# Patient Record
Sex: MALE | Race: WHITE | NOT HISPANIC OR LATINO
[De-identification: names, ages, dates, MRNs, and addresses within clinical notes are randomized per-mention and may not be internally consistent; named-entity substitution may affect disease eponyms.]

---

## 2017-03-23 ENCOUNTER — APPOINTMENT (OUTPATIENT)
Dept: INTERNAL MEDICINE | Facility: CLINIC | Age: 52
End: 2017-03-23

## 2017-03-23 VITALS
SYSTOLIC BLOOD PRESSURE: 112 MMHG | HEART RATE: 74 BPM | BODY MASS INDEX: 30.26 KG/M2 | RESPIRATION RATE: 14 BRPM | DIASTOLIC BLOOD PRESSURE: 60 MMHG | WEIGHT: 199 LBS

## 2017-03-23 DIAGNOSIS — R04.2 HEMOPTYSIS: ICD-10-CM

## 2017-03-23 DIAGNOSIS — Z12.11 ENCOUNTER FOR SCREENING FOR MALIGNANT NEOPLASM OF COLON: ICD-10-CM

## 2017-03-23 DIAGNOSIS — R13.10 DYSPHAGIA, UNSPECIFIED: ICD-10-CM

## 2017-03-23 DIAGNOSIS — M25.559 PAIN IN UNSPECIFIED HIP: ICD-10-CM

## 2017-03-23 DIAGNOSIS — Z82.69 FAMILY HISTORY OF OTHER DISEASES OF THE MUSCULOSKELETAL SYSTEM AND CONNECTIVE TISSUE: ICD-10-CM

## 2017-03-23 DIAGNOSIS — F17.200 NICOTINE DEPENDENCE, UNSPECIFIED, UNCOMPLICATED: ICD-10-CM

## 2017-03-23 DIAGNOSIS — R05 COUGH: ICD-10-CM

## 2017-03-23 RX ORDER — OXYCODONE HYDROCHLORIDE 30 MG/1
30 TABLET ORAL
Qty: 180 | Refills: 0 | Status: ACTIVE | COMMUNITY
Start: 2017-02-21

## 2017-03-24 LAB
25(OH)D3 SERPL-MCNC: 23.4 NG/ML
ALBUMIN SERPL ELPH-MCNC: 4.7 G/DL
ALP BLD-CCNC: 63 U/L
ALT SERPL-CCNC: 18 U/L
ANION GAP SERPL CALC-SCNC: 17 MMOL/L
AST SERPL-CCNC: 22 U/L
BILIRUB SERPL-MCNC: 0.3 MG/DL
BUN SERPL-MCNC: 14 MG/DL
CALCIUM SERPL-MCNC: 9.5 MG/DL
CHLORIDE SERPL-SCNC: 101 MMOL/L
CHOLEST SERPL-MCNC: 239 MG/DL
CHOLEST/HDLC SERPL: 3.5 RATIO
CO2 SERPL-SCNC: 21 MMOL/L
CREAT SERPL-MCNC: 0.93 MG/DL
GLUCOSE SERPL-MCNC: 164 MG/DL
HBA1C MFR BLD HPLC: 6 %
HCV AB SER QL: NONREACTIVE
HCV S/CO RATIO: 0.1 S/CO
HDLC SERPL-MCNC: 68 MG/DL
LDLC SERPL CALC-MCNC: 149 MG/DL
POTASSIUM SERPL-SCNC: 3.8 MMOL/L
PROT SERPL-MCNC: 7.5 G/DL
PSA SERPL-MCNC: 1.92 NG/ML
SODIUM SERPL-SCNC: 139 MMOL/L
TRIGL SERPL-MCNC: 109 MG/DL

## 2017-04-02 LAB
TESTOST BND SERPL-MCNC: 1.7 PG/ML
TESTOST SERPL-MCNC: 240 NG/DL

## 2017-09-01 ENCOUNTER — EMERGENCY (EMERGENCY)
Facility: HOSPITAL | Age: 52
LOS: 1 days | Discharge: HOME | End: 2017-09-01
Attending: EMERGENCY MEDICINE | Admitting: EMERGENCY MEDICINE
Payer: COMMERCIAL

## 2017-09-01 VITALS
DIASTOLIC BLOOD PRESSURE: 93 MMHG | OXYGEN SATURATION: 96 % | RESPIRATION RATE: 18 BRPM | WEIGHT: 207.01 LBS | TEMPERATURE: 98 F | SYSTOLIC BLOOD PRESSURE: 136 MMHG | HEART RATE: 88 BPM

## 2017-09-01 DIAGNOSIS — Z96.642 PRESENCE OF LEFT ARTIFICIAL HIP JOINT: Chronic | ICD-10-CM

## 2017-09-01 PROCEDURE — 99283 EMERGENCY DEPT VISIT LOW MDM: CPT

## 2017-09-01 PROCEDURE — 99283 EMERGENCY DEPT VISIT LOW MDM: CPT | Mod: 25

## 2017-09-01 RX ORDER — ACETAMINOPHEN 500 MG
975 TABLET ORAL ONCE
Qty: 0 | Refills: 0 | Status: COMPLETED | OUTPATIENT
Start: 2017-09-01 | End: 2017-09-01

## 2017-09-01 RX ORDER — OXYCODONE HYDROCHLORIDE 5 MG/1
0 TABLET ORAL
Qty: 0 | Refills: 0 | COMMUNITY

## 2017-09-01 RX ORDER — DIAZEPAM 5 MG
5 TABLET ORAL ONCE
Qty: 0 | Refills: 0 | Status: DISCONTINUED | OUTPATIENT
Start: 2017-09-01 | End: 2017-09-01

## 2017-09-01 RX ORDER — DIAZEPAM 5 MG
1 TABLET ORAL
Qty: 10 | Refills: 0 | OUTPATIENT
Start: 2017-09-01 | End: 2017-09-04

## 2017-09-01 RX ORDER — IBUPROFEN 200 MG
1 TABLET ORAL
Qty: 40 | Refills: 0 | OUTPATIENT
Start: 2017-09-01 | End: 2017-09-11

## 2017-09-01 RX ORDER — MELOXICAM 15 MG/1
0 TABLET ORAL
Qty: 0 | Refills: 0 | COMMUNITY

## 2017-09-01 RX ADMIN — Medication 5 MILLIGRAM(S): at 08:56

## 2017-09-01 RX ADMIN — Medication 975 MILLIGRAM(S): at 08:56

## 2017-09-01 RX ADMIN — Medication 5 MILLIGRAM(S): at 10:48

## 2017-09-01 RX ADMIN — Medication 975 MILLIGRAM(S): at 11:08

## 2017-09-01 NOTE — ED PROVIDER NOTE - MEDICAL DECISION MAKING DETAILS
51yom w/ no PMH p/w 5 days of left sided neck pain radiating down the left shoulder. Woke up with pain in the morning 5 days ago, sharp/tight pain in the left lower neck, radiating to the left shoulder and down the left arm, now associated w/ paresthesias in the left hand. Patient denies chest pain, palpitations, SOB, or diaphoresis.   On exam there is no midline neck tenderness, + TTP alone left trapezius.  No motor or sensory deficits to arm or hand.  No other neurologic complaints outside of intermittent subjective paresthesias of left hand.   No need for emergent MRI, likely cervical radiculopathy, will treat with NSAIDS and muscle relaxants are refer for outpatient spine consult. 51yom w/ no PMH p/w 5 days of left sided neck pain radiating down the left shoulder. Woke up with pain in the morning 5 days ago, sharp/tight pain in the left lower neck, radiating to the left shoulder and down the left arm, now associated w/ paresthesias in the left hand. Patient denies chest pain, palpitations, SOB, or diaphoresis.   On exam there is no midline neck tenderness, + TTP alone left trapezius.  No motor or sensory deficits to arm or hand.  No other neurologic complaints outside of intermittent subjective paresthesias of left hand.   No need for emergent MRI, likely cervical radiculopathy, will treat with NSAIDS and muscle relaxants are refer for outpatient spine consult.    Attending MD Conner: 51 male with PMH for 5 days of left sided neck pain radiating to left arm associated with some parathesias of left hand.  On exam there is left paraspinal and trap pain.  No LUE motor or sensory changes.  Outpatient MRI for cervical radiculopathy,  no need for emergent MRI in house at this time.  Plan discussed with patient and in agreement.

## 2017-09-01 NOTE — ED ADULT NURSE NOTE - OBJECTIVE STATEMENT
51 y.o male c c/o neck pain. Pt woke up on Monday c neck pain. tightening to L shoulder upon palpation. Pt took 30mg oxycodone PO PTA in ER. Pain radiates down L arm. Unable to flex neck backwards d/t pain. Hx of old neck injury. Pt called pain management MD but unable to get prescription for flexeril.

## 2017-09-01 NOTE — ED PROVIDER NOTE - OBJECTIVE STATEMENT
51yom w/ no PMH p/w 5 days of left sided neck pain radiating down the left shoulder. Woke up with pain in the morning 5 days ago, sharp/tight pain in the left lower neck, radiating to the left shoulder and down the left arm, now associated w/ paresthesias in the left hand. No trauma. No bowel/bladder incontinence, saddle anesthesia, changes in gait, peripheral weakness.

## 2017-09-01 NOTE — ED PROVIDER NOTE - ATTENDING CONTRIBUTION TO CARE
Attending MD Conner:  I personally have seen and examined this patient.  Resident note reviewed and agree on plan of care and except where noted.  See MDM for details.

## 2018-01-23 ENCOUNTER — APPOINTMENT (OUTPATIENT)
Dept: RADIOLOGY | Facility: IMAGING CENTER | Age: 53
End: 2018-01-23

## 2018-01-23 ENCOUNTER — OUTPATIENT (OUTPATIENT)
Dept: OUTPATIENT SERVICES | Facility: HOSPITAL | Age: 53
LOS: 1 days | End: 2018-01-23
Payer: COMMERCIAL

## 2018-01-23 DIAGNOSIS — E29.1 TESTICULAR HYPOFUNCTION: ICD-10-CM

## 2018-01-23 DIAGNOSIS — Z96.642 PRESENCE OF LEFT ARTIFICIAL HIP JOINT: Chronic | ICD-10-CM

## 2018-01-23 PROCEDURE — 72190 X-RAY EXAM OF PELVIS: CPT | Mod: 26

## 2018-01-23 PROCEDURE — 72190 X-RAY EXAM OF PELVIS: CPT

## 2018-01-30 ENCOUNTER — APPOINTMENT (OUTPATIENT)
Dept: UROLOGY | Facility: CLINIC | Age: 53
End: 2018-01-30
Payer: COMMERCIAL

## 2018-01-30 VITALS
DIASTOLIC BLOOD PRESSURE: 85 MMHG | WEIGHT: 200 LBS | HEIGHT: 68 IN | HEART RATE: 71 BPM | TEMPERATURE: 98.9 F | BODY MASS INDEX: 30.31 KG/M2 | RESPIRATION RATE: 16 BRPM | SYSTOLIC BLOOD PRESSURE: 127 MMHG

## 2018-01-30 PROCEDURE — 99244 OFF/OP CNSLTJ NEW/EST MOD 40: CPT

## 2018-02-05 LAB
25(OH)D3 SERPL-MCNC: 19.9 NG/ML
ALBUMIN SERPL ELPH-MCNC: 4.6 G/DL
ALP BLD-CCNC: 72 U/L
ALT SERPL-CCNC: 11 U/L
ANION GAP SERPL CALC-SCNC: 13 MMOL/L
AST SERPL-CCNC: 16 U/L
BASOPHILS # BLD AUTO: 0.03 K/UL
BASOPHILS NFR BLD AUTO: 0.5 %
BILIRUB SERPL-MCNC: <0.2 MG/DL
BUN SERPL-MCNC: 14 MG/DL
CALCIUM SERPL-MCNC: 9.6 MG/DL
CHLORIDE SERPL-SCNC: 98 MMOL/L
CHOLEST SERPL-MCNC: 197 MG/DL
CHOLEST/HDLC SERPL: 4 RATIO
CO2 SERPL-SCNC: 28 MMOL/L
CREAT SERPL-MCNC: 0.87 MG/DL
EOSINOPHIL # BLD AUTO: 0.13 K/UL
EOSINOPHIL NFR BLD AUTO: 2 %
ESTRADIOL SERPL-MCNC: 27 PG/ML
FSH SERPL-MCNC: <0.1 IU/L
GLUCOSE SERPL-MCNC: 91 MG/DL
HBA1C MFR BLD HPLC: 5.6 %
HCT VFR BLD CALC: 42.2 %
HDLC SERPL-MCNC: 49 MG/DL
HGB BLD-MCNC: 14.2 G/DL
IMM GRANULOCYTES NFR BLD AUTO: 0.2 %
LDLC SERPL CALC-MCNC: 131 MG/DL
LH SERPL-ACNC: <0.1 IU/L
LYMPHOCYTES # BLD AUTO: 2.01 K/UL
LYMPHOCYTES NFR BLD AUTO: 30.6 %
MAN DIFF?: NORMAL
MCHC RBC-ENTMCNC: 29 PG
MCHC RBC-ENTMCNC: 33.6 GM/DL
MCV RBC AUTO: 86.3 FL
MONOCYTES # BLD AUTO: 0.51 K/UL
MONOCYTES NFR BLD AUTO: 7.8 %
NEUTROPHILS # BLD AUTO: 3.87 K/UL
NEUTROPHILS NFR BLD AUTO: 58.9 %
PLATELET # BLD AUTO: 290 K/UL
POTASSIUM SERPL-SCNC: 3.9 MMOL/L
PROLACTIN SERPL-MCNC: 12.5 NG/ML
PROT SERPL-MCNC: 7.3 G/DL
PSA SERPL-MCNC: 2.07 NG/ML
RBC # BLD: 4.89 M/UL
RBC # FLD: 12.4 %
SODIUM SERPL-SCNC: 139 MMOL/L
TESTOST BND SERPL-MCNC: 11.3 PG/ML
TESTOST SERPL-MCNC: 813.5 NG/DL
TRIGL SERPL-MCNC: 85 MG/DL
TSH SERPL-ACNC: 1.92 UIU/ML
WBC # FLD AUTO: 6.56 K/UL

## 2018-02-13 ENCOUNTER — APPOINTMENT (OUTPATIENT)
Dept: UROLOGY | Facility: CLINIC | Age: 53
End: 2018-02-13
Payer: COMMERCIAL

## 2018-02-13 PROCEDURE — 99214 OFFICE O/P EST MOD 30 MIN: CPT

## 2018-04-10 LAB
BASOPHILS # BLD AUTO: 0.02 K/UL
BASOPHILS NFR BLD AUTO: 0.3 %
EOSINOPHIL # BLD AUTO: 0.07 K/UL
EOSINOPHIL NFR BLD AUTO: 1.1 %
ESTRADIOL SERPL-MCNC: 25 PG/ML
FSH SERPL-MCNC: 0.1 IU/L
HCT VFR BLD CALC: 42.7 %
HGB BLD-MCNC: 14.2 G/DL
IMM GRANULOCYTES NFR BLD AUTO: 0.2 %
LH SERPL-ACNC: <0.1 IU/L
LYMPHOCYTES # BLD AUTO: 1.73 K/UL
LYMPHOCYTES NFR BLD AUTO: 27.8 %
MAN DIFF?: NORMAL
MCHC RBC-ENTMCNC: 28.7 PG
MCHC RBC-ENTMCNC: 33.3 GM/DL
MCV RBC AUTO: 86.4 FL
MONOCYTES # BLD AUTO: 0.55 K/UL
MONOCYTES NFR BLD AUTO: 8.8 %
NEUTROPHILS # BLD AUTO: 3.84 K/UL
NEUTROPHILS NFR BLD AUTO: 61.8 %
PLATELET # BLD AUTO: 247 K/UL
RBC # BLD: 4.94 M/UL
RBC # FLD: 13.3 %
WBC # FLD AUTO: 6.22 K/UL

## 2018-04-18 LAB
TESTOST BND SERPL-MCNC: 7.8 PG/ML
TESTOST SERPL-MCNC: 615.8 NG/DL

## 2018-05-31 ENCOUNTER — APPOINTMENT (OUTPATIENT)
Dept: UROLOGY | Facility: CLINIC | Age: 53
End: 2018-05-31
Payer: COMMERCIAL

## 2018-05-31 ENCOUNTER — OUTPATIENT (OUTPATIENT)
Dept: OUTPATIENT SERVICES | Facility: HOSPITAL | Age: 53
LOS: 1 days | End: 2018-05-31
Payer: COMMERCIAL

## 2018-05-31 VITALS
DIASTOLIC BLOOD PRESSURE: 84 MMHG | RESPIRATION RATE: 16 BRPM | TEMPERATURE: 98.4 F | HEART RATE: 61 BPM | SYSTOLIC BLOOD PRESSURE: 128 MMHG

## 2018-05-31 DIAGNOSIS — E34.9 ENDOCRINE DISORDER, UNSPECIFIED: ICD-10-CM

## 2018-05-31 DIAGNOSIS — Z96.642 PRESENCE OF LEFT ARTIFICIAL HIP JOINT: Chronic | ICD-10-CM

## 2018-05-31 DIAGNOSIS — R35.0 FREQUENCY OF MICTURITION: ICD-10-CM

## 2018-05-31 PROCEDURE — 11980 IMPLANT HORMONE PELLET(S): CPT

## 2018-06-01 LAB
ESTRADIOL SERPL-MCNC: <5 PG/ML
LH SERPL-ACNC: 0.2 IU/L

## 2018-06-04 DIAGNOSIS — E34.9 ENDOCRINE DISORDER, UNSPECIFIED: ICD-10-CM

## 2018-06-08 LAB
TESTOST BND SERPL-MCNC: 1.4 PG/ML
TESTOST SERPL-MCNC: 176 NG/DL

## 2018-07-17 ENCOUNTER — APPOINTMENT (OUTPATIENT)
Dept: INTERNAL MEDICINE | Facility: CLINIC | Age: 53
End: 2018-07-17
Payer: COMMERCIAL

## 2018-07-17 DIAGNOSIS — Z00.00 ENCOUNTER FOR GENERAL ADULT MEDICAL EXAMINATION W/OUT ABNORMAL FINDINGS: ICD-10-CM

## 2018-07-17 DIAGNOSIS — Z87.891 PERSONAL HISTORY OF NICOTINE DEPENDENCE: ICD-10-CM

## 2018-07-17 PROCEDURE — 99396 PREV VISIT EST AGE 40-64: CPT

## 2018-07-18 VITALS
BODY MASS INDEX: 29.65 KG/M2 | WEIGHT: 195 LBS | HEART RATE: 60 BPM | RESPIRATION RATE: 14 BRPM | SYSTOLIC BLOOD PRESSURE: 110 MMHG | DIASTOLIC BLOOD PRESSURE: 60 MMHG

## 2018-07-18 LAB
25(OH)D3 SERPL-MCNC: 25.8 NG/ML
ALBUMIN SERPL ELPH-MCNC: 4.6 G/DL
ALP BLD-CCNC: 64 U/L
ALT SERPL-CCNC: 12 U/L
ANION GAP SERPL CALC-SCNC: 15 MMOL/L
AST SERPL-CCNC: 16 U/L
BILIRUB SERPL-MCNC: 0.3 MG/DL
BUN SERPL-MCNC: 18 MG/DL
CALCIUM SERPL-MCNC: 9.2 MG/DL
CHLORIDE SERPL-SCNC: 101 MMOL/L
CHOLEST SERPL-MCNC: 210 MG/DL
CHOLEST/HDLC SERPL: 4.1 RATIO
CO2 SERPL-SCNC: 24 MMOL/L
CREAT SERPL-MCNC: 0.93 MG/DL
GLUCOSE SERPL-MCNC: 103 MG/DL
HBA1C MFR BLD HPLC: 5.4 %
HDLC SERPL-MCNC: 51 MG/DL
HIV1+2 AB SPEC QL IA.RAPID: NONREACTIVE
LDLC SERPL CALC-MCNC: 134 MG/DL
POTASSIUM SERPL-SCNC: 4 MMOL/L
PROT SERPL-MCNC: 6.5 G/DL
PSA SERPL-MCNC: 1.9 NG/ML
SODIUM SERPL-SCNC: 140 MMOL/L
TRIGL SERPL-MCNC: 125 MG/DL

## 2018-07-18 RX ORDER — MELOXICAM 15 MG/1
TABLET ORAL
Refills: 0 | Status: ACTIVE | COMMUNITY

## 2018-07-18 RX ORDER — TIZANIDINE 4 MG/1
TABLET ORAL
Refills: 0 | Status: ACTIVE | COMMUNITY

## 2018-09-06 RX ORDER — TESTOSTERONE 75 MG/1
75 PELLET SUBCUTANEOUS
Qty: 10 | Refills: 0 | Status: ACTIVE | COMMUNITY
Start: 2018-09-06 | End: 1900-01-01

## 2018-10-01 LAB
BASOPHILS # BLD AUTO: 0.01 K/UL
BASOPHILS NFR BLD AUTO: 0.2 %
EOSINOPHIL # BLD AUTO: 0.14 K/UL
EOSINOPHIL NFR BLD AUTO: 2.2 %
ESTRADIOL SERPL-MCNC: 9 PG/ML
HCT VFR BLD CALC: 45.1 %
HGB BLD-MCNC: 14.8 G/DL
IMM GRANULOCYTES NFR BLD AUTO: 0.3 %
LH SERPL-ACNC: <0.1 IU/L
LYMPHOCYTES # BLD AUTO: 2.21 K/UL
LYMPHOCYTES NFR BLD AUTO: 34.6 %
MAN DIFF?: NORMAL
MCHC RBC-ENTMCNC: 28.7 PG
MCHC RBC-ENTMCNC: 32.8 GM/DL
MCV RBC AUTO: 87.6 FL
MONOCYTES # BLD AUTO: 0.62 K/UL
MONOCYTES NFR BLD AUTO: 9.7 %
NEUTROPHILS # BLD AUTO: 3.39 K/UL
NEUTROPHILS NFR BLD AUTO: 53 %
PLATELET # BLD AUTO: 245 K/UL
RBC # BLD: 5.15 M/UL
RBC # FLD: 12.4 %
TESTOST BND SERPL-MCNC: 3.9 PG/ML
TESTOST SERPL-MCNC: 395.8 NG/DL
WBC # FLD AUTO: 6.39 K/UL

## 2018-10-30 PROBLEM — E29.1 TESTICULAR HYPOFUNCTION: Chronic | Status: ACTIVE | Noted: 2017-09-01

## 2018-11-01 ENCOUNTER — APPOINTMENT (OUTPATIENT)
Dept: UROLOGY | Facility: CLINIC | Age: 53
End: 2018-11-01
Payer: COMMERCIAL

## 2018-11-01 ENCOUNTER — OUTPATIENT (OUTPATIENT)
Dept: OUTPATIENT SERVICES | Facility: HOSPITAL | Age: 53
LOS: 1 days | End: 2018-11-01
Payer: COMMERCIAL

## 2018-11-01 VITALS
BODY MASS INDEX: 29.55 KG/M2 | HEIGHT: 68 IN | DIASTOLIC BLOOD PRESSURE: 83 MMHG | SYSTOLIC BLOOD PRESSURE: 143 MMHG | WEIGHT: 195 LBS | RESPIRATION RATE: 16 BRPM | HEART RATE: 64 BPM

## 2018-11-01 DIAGNOSIS — Z96.642 PRESENCE OF LEFT ARTIFICIAL HIP JOINT: Chronic | ICD-10-CM

## 2018-11-01 PROCEDURE — 11980 IMPLANT HORMONE PELLET(S): CPT

## 2018-11-06 ENCOUNTER — APPOINTMENT (OUTPATIENT)
Dept: UROLOGY | Facility: CLINIC | Age: 53
End: 2018-11-06
Payer: COMMERCIAL

## 2018-11-06 DIAGNOSIS — R39.11 HESITANCY OF MICTURITION: ICD-10-CM

## 2018-11-06 DIAGNOSIS — R39.198 OTHER DIFFICULTIES WITH MICTURITION: ICD-10-CM

## 2018-11-06 DIAGNOSIS — R35.0 FREQUENCY OF MICTURITION: ICD-10-CM

## 2018-11-06 PROCEDURE — 99214 OFFICE O/P EST MOD 30 MIN: CPT

## 2018-11-06 RX ORDER — TAMSULOSIN HYDROCHLORIDE 0.4 MG/1
0.4 CAPSULE ORAL
Qty: 30 | Refills: 6 | Status: ACTIVE | COMMUNITY
Start: 2018-11-06 | End: 1900-01-01

## 2018-11-07 DIAGNOSIS — E29.1 TESTICULAR HYPOFUNCTION: ICD-10-CM

## 2018-11-07 DIAGNOSIS — R35.0 FREQUENCY OF MICTURITION: ICD-10-CM

## 2018-11-07 LAB
BASOPHILS # BLD AUTO: 0.02 K/UL
BASOPHILS NFR BLD AUTO: 0.3 %
EOSINOPHIL # BLD AUTO: 0.15 K/UL
EOSINOPHIL NFR BLD AUTO: 2.6 %
ESTRADIOL SERPL-MCNC: 11 PG/ML
HCT VFR BLD CALC: 39.4 %
HGB BLD-MCNC: 12.9 G/DL
IMM GRANULOCYTES NFR BLD AUTO: 0.3 %
LH SERPL-ACNC: 1.6 IU/L
LYMPHOCYTES # BLD AUTO: 2.42 K/UL
LYMPHOCYTES NFR BLD AUTO: 41.5 %
MAN DIFF?: NORMAL
MCHC RBC-ENTMCNC: 28.5 PG
MCHC RBC-ENTMCNC: 32.7 GM/DL
MCV RBC AUTO: 87 FL
MONOCYTES # BLD AUTO: 0.54 K/UL
MONOCYTES NFR BLD AUTO: 9.3 %
NEUTROPHILS # BLD AUTO: 2.68 K/UL
NEUTROPHILS NFR BLD AUTO: 46 %
PLATELET # BLD AUTO: 228 K/UL
RBC # BLD: 4.53 M/UL
RBC # FLD: 13.2 %
TESTOST BND SERPL-MCNC: 2 PG/ML
TESTOST SERPL-MCNC: 284.5 NG/DL
WBC # FLD AUTO: 5.83 K/UL

## 2018-12-27 ENCOUNTER — CLINICAL ADVICE (OUTPATIENT)
Age: 53
End: 2018-12-27

## 2019-02-05 RX ORDER — TESTOSTERONE 75 MG/1
75 PELLET SUBCUTANEOUS
Qty: 10 | Refills: 0 | Status: ACTIVE | COMMUNITY
Start: 2018-10-01 | End: 1900-01-01

## 2019-03-19 ENCOUNTER — OUTPATIENT (OUTPATIENT)
Dept: OUTPATIENT SERVICES | Facility: HOSPITAL | Age: 54
LOS: 1 days | End: 2019-03-19
Payer: COMMERCIAL

## 2019-03-19 ENCOUNTER — APPOINTMENT (OUTPATIENT)
Dept: UROLOGY | Facility: CLINIC | Age: 54
End: 2019-03-19
Payer: COMMERCIAL

## 2019-03-19 VITALS — RESPIRATION RATE: 16 BRPM | HEART RATE: 60 BPM | DIASTOLIC BLOOD PRESSURE: 83 MMHG | SYSTOLIC BLOOD PRESSURE: 137 MMHG

## 2019-03-19 DIAGNOSIS — Z96.642 PRESENCE OF LEFT ARTIFICIAL HIP JOINT: Chronic | ICD-10-CM

## 2019-03-19 DIAGNOSIS — R35.0 FREQUENCY OF MICTURITION: ICD-10-CM

## 2019-03-19 PROCEDURE — 11980 IMPLANT HORMONE PELLET(S): CPT

## 2019-03-28 DIAGNOSIS — N52.9 MALE ERECTILE DYSFUNCTION, UNSPECIFIED: ICD-10-CM

## 2019-06-13 ENCOUNTER — RX RENEWAL (OUTPATIENT)
Age: 54
End: 2019-06-13

## 2019-06-21 ENCOUNTER — RX RENEWAL (OUTPATIENT)
Age: 54
End: 2019-06-21

## 2019-06-25 ENCOUNTER — APPOINTMENT (OUTPATIENT)
Dept: UROLOGY | Facility: CLINIC | Age: 54
End: 2019-06-25

## 2019-06-25 VITALS
RESPIRATION RATE: 16 BRPM | HEART RATE: 76 BPM | WEIGHT: 195 LBS | BODY MASS INDEX: 29.55 KG/M2 | HEIGHT: 68 IN | SYSTOLIC BLOOD PRESSURE: 131 MMHG | DIASTOLIC BLOOD PRESSURE: 80 MMHG

## 2019-06-27 LAB
BASOPHILS # BLD AUTO: 0.03 K/UL
BASOPHILS NFR BLD AUTO: 0.5 %
EOSINOPHIL # BLD AUTO: 0.09 K/UL
EOSINOPHIL NFR BLD AUTO: 1.6 %
ESTRADIOL SERPL-MCNC: 12 PG/ML
HCT VFR BLD CALC: 48.6 %
HGB BLD-MCNC: 15.1 G/DL
IMM GRANULOCYTES NFR BLD AUTO: 0.2 %
LH SERPL-ACNC: <0.3 IU/L
LYMPHOCYTES # BLD AUTO: 1.48 K/UL
LYMPHOCYTES NFR BLD AUTO: 25.5 %
MAN DIFF?: NORMAL
MCHC RBC-ENTMCNC: 28.9 PG
MCHC RBC-ENTMCNC: 31.1 GM/DL
MCV RBC AUTO: 92.9 FL
MONOCYTES # BLD AUTO: 0.56 K/UL
MONOCYTES NFR BLD AUTO: 9.7 %
NEUTROPHILS # BLD AUTO: 3.63 K/UL
NEUTROPHILS NFR BLD AUTO: 62.5 %
PLATELET # BLD AUTO: 239 K/UL
RBC # BLD: 5.23 M/UL
RBC # FLD: 12.2 %
WBC # FLD AUTO: 5.8 K/UL

## 2019-07-02 ENCOUNTER — APPOINTMENT (OUTPATIENT)
Dept: UROLOGY | Facility: CLINIC | Age: 54
End: 2019-07-02
Payer: COMMERCIAL

## 2019-07-02 ENCOUNTER — OUTPATIENT (OUTPATIENT)
Dept: OUTPATIENT SERVICES | Facility: HOSPITAL | Age: 54
LOS: 1 days | End: 2019-07-02
Payer: COMMERCIAL

## 2019-07-02 VITALS
SYSTOLIC BLOOD PRESSURE: 129 MMHG | RESPIRATION RATE: 16 BRPM | HEART RATE: 78 BPM | DIASTOLIC BLOOD PRESSURE: 79 MMHG | TEMPERATURE: 98 F

## 2019-07-02 DIAGNOSIS — R35.0 FREQUENCY OF MICTURITION: ICD-10-CM

## 2019-07-02 DIAGNOSIS — Z96.642 PRESENCE OF LEFT ARTIFICIAL HIP JOINT: Chronic | ICD-10-CM

## 2019-07-02 PROCEDURE — 11980 IMPLANT HORMONE PELLET(S): CPT

## 2019-07-03 DIAGNOSIS — E34.9 ENDOCRINE DISORDER, UNSPECIFIED: ICD-10-CM

## 2019-11-18 ENCOUNTER — RX RENEWAL (OUTPATIENT)
Age: 54
End: 2019-11-18

## 2019-11-27 ENCOUNTER — APPOINTMENT (OUTPATIENT)
Dept: PAIN MANAGEMENT | Facility: CLINIC | Age: 54
End: 2019-11-27

## 2019-12-05 ENCOUNTER — APPOINTMENT (OUTPATIENT)
Dept: UROLOGY | Facility: CLINIC | Age: 54
End: 2019-12-05
Payer: COMMERCIAL

## 2019-12-05 PROCEDURE — 99214 OFFICE O/P EST MOD 30 MIN: CPT

## 2019-12-05 NOTE — ASSESSMENT
[FreeTextEntry1] : Patient with hypogonadism and ED. He is taking 5 Cialis for ED with good results. He previously underwent Testopel insertion on 7/2/19. Total testosterone is 813. He would like to continue Testopel.\par \par This pleasant gentleman presents for evaluation of his erectile dysfunction and hypogonadism.\par \par -I orderd blood tests\par -He will undergo his next testopel insertion as soon as the medication arrives\par -Continue 5mg cialis for ED. \par

## 2019-12-05 NOTE — HISTORY OF PRESENT ILLNESS
[FreeTextEntry1] : Patient with hypogonadism and ED. He is taking 5 Cialis for ED with good results. He previously underwent Testopel insertion on 7/2/19. Total testosterone is 813. He would like to continue Testopel.\par \par HPI:\par Patient presents with a chief complaint of erectile dysfunction and low testosterone. We reviewed the questionnaire he completed in detail. His erections are not modified with the degree of sexual stimulation. He states that his erections presently are oftentimes 6 out of 10 in both tumescence and rigidity.. He has difficulty maintaining an erection. He describes a normal libido. His sexual dysfunction occurs with both sexual relations and masturbation. His erections are well managed with 5mg cialis. \par \par His past medical history is non-contributory. 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 His review of systems and past medical history demonstrates no significant urologic issues (see patient completed questionnaire). His family history demonstrates no significant urologic issues. Refer to his patient questionnaire for his AUA symptom score and sexual function symptom (normal is 60-75).\par \par He was previously diagnosed with low testosterone 2 years ago. Originally had decreased energy and decreased libido symptoms. Has been treated by another urologist using testopel. He would like to continue testopel. For his ED, he is using 5mg cialis with satisfactory results. He also has mild LUTS, primarily difficulty initiating stream some times.\par

## 2019-12-05 NOTE — PHYSICAL EXAM
[General Appearance - Well Developed] : well developed [Normal Appearance] : normal appearance [Well Groomed] : well groomed [General Appearance - Well Nourished] : well nourished [General Appearance - In No Acute Distress] : no acute distress [Abdomen Soft] : soft [Costovertebral Angle Tenderness] : no ~M costovertebral angle tenderness [Abdomen Tenderness] : non-tender [Urethral Meatus] : meatus normal [Testes Mass (___cm)] : there were no testicular masses [Scrotum] : the scrotum was normal [No Prostate Nodules] : no prostate nodules [Urinary Bladder Findings] : the bladder was normal on palpation [Edema] : no peripheral edema [Respiration, Rhythm And Depth] : normal respiratory rhythm and effort [Exaggerated Use Of Accessory Muscles For Inspiration] : no accessory muscle use [] : no respiratory distress [Affect] : the affect was normal [Oriented To Time, Place, And Person] : oriented to person, place, and time [Mood] : the mood was normal [Not Anxious] : not anxious [Normal Station and Gait] : the gait and station were normal for the patient's age [No Focal Deficits] : no focal deficits [No Palpable Adenopathy] : no palpable adenopathy

## 2020-02-13 RX ORDER — TESTOSTERONE 75 MG/1
75 PELLET SUBCUTANEOUS
Qty: 10 | Refills: 0 | Status: ACTIVE | COMMUNITY
Start: 2018-03-02 | End: 1900-01-01

## 2020-03-03 ENCOUNTER — OUTPATIENT (OUTPATIENT)
Dept: OUTPATIENT SERVICES | Facility: HOSPITAL | Age: 55
LOS: 1 days | End: 2020-03-03
Payer: COMMERCIAL

## 2020-03-03 ENCOUNTER — APPOINTMENT (OUTPATIENT)
Dept: UROLOGY | Facility: CLINIC | Age: 55
End: 2020-03-03
Payer: COMMERCIAL

## 2020-03-03 VITALS
TEMPERATURE: 98 F | SYSTOLIC BLOOD PRESSURE: 131 MMHG | HEART RATE: 79 BPM | RESPIRATION RATE: 17 BRPM | DIASTOLIC BLOOD PRESSURE: 82 MMHG

## 2020-03-03 VITALS
RESPIRATION RATE: 17 BRPM | HEART RATE: 78 BPM | TEMPERATURE: 98 F | DIASTOLIC BLOOD PRESSURE: 72 MMHG | SYSTOLIC BLOOD PRESSURE: 121 MMHG

## 2020-03-03 DIAGNOSIS — Z96.642 PRESENCE OF LEFT ARTIFICIAL HIP JOINT: Chronic | ICD-10-CM

## 2020-03-03 DIAGNOSIS — R35.0 FREQUENCY OF MICTURITION: ICD-10-CM

## 2020-03-03 PROCEDURE — 11980 IMPLANT HORMONE PELLET(S): CPT

## 2020-03-05 LAB
25(OH)D3 SERPL-MCNC: 12.5 NG/ML
BASOPHILS # BLD AUTO: 0.03 K/UL
BASOPHILS NFR BLD AUTO: 0.6 %
EOSINOPHIL # BLD AUTO: 0.13 K/UL
EOSINOPHIL NFR BLD AUTO: 2.5 %
ESTRADIOL SERPL-MCNC: 17 PG/ML
FSH SERPL-MCNC: 5.6 IU/L
HCT VFR BLD CALC: 37.6 %
HGB BLD-MCNC: 12.3 G/DL
IMM GRANULOCYTES NFR BLD AUTO: 0.2 %
LH SERPL-ACNC: 1.2 IU/L
LYMPHOCYTES # BLD AUTO: 2.32 K/UL
LYMPHOCYTES NFR BLD AUTO: 44.4 %
MAN DIFF?: NORMAL
MCHC RBC-ENTMCNC: 29.4 PG
MCHC RBC-ENTMCNC: 32.7 GM/DL
MCV RBC AUTO: 89.7 FL
MONOCYTES # BLD AUTO: 0.59 K/UL
MONOCYTES NFR BLD AUTO: 11.3 %
NEUTROPHILS # BLD AUTO: 2.15 K/UL
NEUTROPHILS NFR BLD AUTO: 41 %
PLATELET # BLD AUTO: 258 K/UL
PSA SERPL-MCNC: 0.94 NG/ML
RBC # BLD: 4.19 M/UL
RBC # FLD: 12.3 %
WBC # FLD AUTO: 5.23 K/UL

## 2020-03-06 DIAGNOSIS — E34.9 ENDOCRINE DISORDER, UNSPECIFIED: ICD-10-CM

## 2020-03-09 LAB
TESTOST BND SERPL-MCNC: 1.1 PG/ML
TESTOST SERPL-MCNC: 172.5 NG/DL

## 2020-04-30 ENCOUNTER — APPOINTMENT (OUTPATIENT)
Dept: UROLOGY | Facility: CLINIC | Age: 55
End: 2020-04-30
Payer: COMMERCIAL

## 2020-04-30 DIAGNOSIS — N40.1 BENIGN PROSTATIC HYPERPLASIA WITH LOWER URINARY TRACT SYMPMS: ICD-10-CM

## 2020-04-30 DIAGNOSIS — E29.1 TESTICULAR HYPOFUNCTION: ICD-10-CM

## 2020-04-30 DIAGNOSIS — N52.9 MALE ERECTILE DYSFUNCTION, UNSPECIFIED: ICD-10-CM

## 2020-04-30 DIAGNOSIS — R39.11 BENIGN PROSTATIC HYPERPLASIA WITH LOWER URINARY TRACT SYMPMS: ICD-10-CM

## 2020-04-30 PROCEDURE — 99214 OFFICE O/P EST MOD 30 MIN: CPT | Mod: 95

## 2020-04-30 NOTE — ASSESSMENT
[FreeTextEntry1] : The patient presents for a telehealth consultation 2 months after implantation of Testopel.  He has hypogonadism and ED. He is taking 5 Cialis every other day for ED with good results.  He wanted to discuss his recent blood studies and options for therapy.\par \par Blood studies were taken 6 months after the prior Testopel implantation.  His hematocrit was slightly low and I have suggested medical evaluation.  His testosterone was also 179 ng/dL however, he was 6 months after the last Testopel placement.  He has been using Cialis 5 mg every other day and states it has good results.  I have renewed his Cialis and will ask him to call in 2 months to renew his Testopel for reimplantation. \par \par Telehealth Consultation: 30 minutes  10 minutes reviewing his history and discussing prior results.  20 minutes discussing various treatment options, writing medication prescriptions,  and writing his note. There was also additional time in preparing for the visit and assisting the patient with technology issues he was having with the telehealth platform.

## 2020-04-30 NOTE — HISTORY OF PRESENT ILLNESS
[Home] : at home, [unfilled] , at the time of the visit. [Other Location: e.g. Home (Enter Location, City,State)___] : at [unfilled] [Self] : self [Patient] : the patient [FreeTextEntry1] : The patient-doctor. relationship has been established in a face-to-face fashion on real-time video audio HIPAA compliant communication using telemedicine software.  The patient's identity has been confirmed.  The patient was previously emailed a copy of the telemedicine consent.  The patient has had a chance to review and has now given verbal consent and has requested care to be assessed and treated through telemedicine. The patient understands there may be limitations in this process and that they need not need further follow-up care in the office and/or hospital settings.\par \par Verbal consent was given on Thursday, April 30, 2020 at 12:15 PM by the patient.  It was requested by the physician.  A written consent was previously sent for the patient to sign and return.\par \par The patient presents for a telehealth consultation 2 months after implantation of Testopel.  He has hypogonadism and ED. He is taking 5 Cialis every other day for ED with good results.  He wanted to discuss his recent blood studies and options for therapy.\par \par HPI:\par Patient presents with a chief complaint of erectile dysfunction and low testosterone. We reviewed the questionnaire he completed in detail. His erections are not modified with the degree of sexual stimulation. He states that his erections presently are oftentimes 6 out of 10 in both tumescence and rigidity.. He has difficulty maintaining an erection. He describes a normal libido. His sexual dysfunction occurs with both sexual relations and masturbation. His erections are well managed with 5mg cialis. \par \par His past medical history is non-contributory. 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 His review of systems and past medical history demonstrates no significant urologic issues (see patient completed questionnaire). His family history demonstrates no significant urologic issues. Refer to his patient questionnaire for his AUA symptom score and sexual function symptom (normal is 60-75).\par \par He was previously diagnosed with low testosterone 2 years ago. Originally had decreased energy and decreased libido symptoms. Has been treated by another urologist using testopel. He would like to continue testopel. For his ED, he is using 5mg cialis with satisfactory results. He also has mild LUTS, primarily difficulty initiating stream some times.\par

## 2020-07-06 LAB
TESTOST BND SERPL-MCNC: 6.1 PG/ML
TESTOST SERPL-MCNC: 471.8 NG/DL

## 2020-09-17 ENCOUNTER — APPOINTMENT (OUTPATIENT)
Dept: UROLOGY | Facility: CLINIC | Age: 55
End: 2020-09-17
Payer: COMMERCIAL

## 2020-09-17 VITALS — TEMPERATURE: 98 F

## 2020-09-17 DIAGNOSIS — E34.9 ENDOCRINE DISORDER, UNSPECIFIED: ICD-10-CM

## 2020-09-17 PROCEDURE — 99214 OFFICE O/P EST MOD 30 MIN: CPT

## 2020-09-17 NOTE — ASSESSMENT
[FreeTextEntry1] : The patient presents for consultation 6 months after implantation of Testopel.  He has not had another chest detail implantation since the medication is on backorder. He states he is very angry and has no libido. He also feels when here he arrives home that is exhausted and goes to sleep. He feels a low testosterone is also causing marital difficulties.  He is taking 5 Cialis every other day for ED with good results.  He wanted to discuss options for therapy.\par \par Discussed various options including injectable therapy and gels. He would like time to think about it. He also wanted a prescription for his daily Cialis which I wrote in I have requested blood test for today Will discuss results with him in 2 weeks.\par \par Consultation: 30 minutes  10 minutes reviewing his history and performing a physical examination.  20 minutes reviewing his options for testosterone supplementation, discussing treatment options, writing request for blood studies and writing his note..\par

## 2020-09-17 NOTE — PHYSICAL EXAM
[General Appearance - Well Developed] : well developed [General Appearance - Well Nourished] : well nourished [Normal Appearance] : normal appearance [Well Groomed] : well groomed [General Appearance - In No Acute Distress] : no acute distress [Oriented To Time, Place, And Person] : oriented to person, place, and time [Affect] : the affect was normal [Mood] : the mood was normal [Not Anxious] : not anxious [Abdomen Soft] : soft [Urethral Meatus] : meatus normal [Urinary Bladder Findings] : the bladder was normal on palpation [Scrotum] : the scrotum was normal [Testes Mass (___cm)] : there were no testicular masses [Normal Station and Gait] : the gait and station were normal for the patient's age

## 2020-09-17 NOTE — HISTORY OF PRESENT ILLNESS
[FreeTextEntry1] : The patient presents for consultation 6 months after implantation of Testopel.  He has not had another chest detail implantation since the medication is on backorder. He states he is very angry and has no libido. He also feels when here he arrives home that is exhausted and goes to sleep. He feels a low testosterone is also causing marital difficulties.  He is taking 5 Cialis every other day for ED with good results.  He wanted to discuss options for therapy.\par \par HPI:\par Patient presents with a chief complaint of erectile dysfunction and low testosterone. We reviewed the questionnaire he completed in detail. His erections are not modified with the degree of sexual stimulation. He states that his erections presently are oftentimes 6 out of 10 in both tumescence and rigidity.. He has difficulty maintaining an erection. He describes a normal libido. His sexual dysfunction occurs with both sexual relations and masturbation. His erections are well managed with 5mg cialis. \par \par His past medical history is non-contributory. 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 His review of systems and past medical history demonstrates no significant urologic issues (see patient completed questionnaire). His family history demonstrates no significant urologic issues. Refer to his patient questionnaire for his AUA symptom score and sexual function symptom (normal is 60-75).\par \par He was previously diagnosed with low testosterone 2 years ago. Originally had decreased energy and decreased libido symptoms. Has been treated by another urologist using testopel. He would like to continue testopel. For his ED, he is using 5mg cialis with satisfactory results. He also has mild LUTS, primarily difficulty initiating stream some times.\par

## 2020-09-18 LAB
25(OH)D3 SERPL-MCNC: 24.5 NG/ML
BASOPHILS # BLD AUTO: 0.06 K/UL
BASOPHILS NFR BLD AUTO: 1.1 %
EOSINOPHIL # BLD AUTO: 0.12 K/UL
EOSINOPHIL NFR BLD AUTO: 2.2 %
ESTRADIOL SERPL-MCNC: 6 PG/ML
FSH SERPL-MCNC: 6.5 IU/L
HCT VFR BLD CALC: 42.1 %
HGB BLD-MCNC: 13.9 G/DL
IMM GRANULOCYTES NFR BLD AUTO: 0.2 %
LH SERPL-ACNC: 3.1 IU/L
LYMPHOCYTES # BLD AUTO: 1.78 K/UL
LYMPHOCYTES NFR BLD AUTO: 32.6 %
MAN DIFF?: NORMAL
MCHC RBC-ENTMCNC: 29.1 PG
MCHC RBC-ENTMCNC: 33 GM/DL
MCV RBC AUTO: 88.1 FL
MONOCYTES # BLD AUTO: 0.49 K/UL
MONOCYTES NFR BLD AUTO: 9 %
NEUTROPHILS # BLD AUTO: 3 K/UL
NEUTROPHILS NFR BLD AUTO: 54.9 %
PLATELET # BLD AUTO: 253 K/UL
PROLACTIN SERPL-MCNC: 10.5 NG/ML
PSA SERPL-MCNC: 0.8 NG/ML
RBC # BLD: 4.78 M/UL
RBC # FLD: 13 %
WBC # FLD AUTO: 5.46 K/UL

## 2020-09-23 LAB
TESTOST BND SERPL-MCNC: 4.9 PG/ML
TESTOST SERPL-MCNC: 207.4 NG/DL

## 2020-10-02 ENCOUNTER — APPOINTMENT (OUTPATIENT)
Dept: UROLOGY | Facility: CLINIC | Age: 55
End: 2020-10-02
Payer: COMMERCIAL

## 2020-10-02 DIAGNOSIS — N52.9 MALE ERECTILE DYSFUNCTION, UNSPECIFIED: ICD-10-CM

## 2020-10-02 PROCEDURE — 99214 OFFICE O/P EST MOD 30 MIN: CPT | Mod: 95

## 2020-10-02 NOTE — ASSESSMENT
[FreeTextEntry1] : The patient presented for a telehealth consultation.  He was remains off all testosterone supplementation.  He states he is less angry than he had been before and feels that his testosterone is getting better on its own.  He has been discussing with his wife whether to go back on testosterone supplementation and has been talking to my  regarding the availability of Testopel.  He has not decided what to do at this point in time.  We needed to review his recent blood studies.  He has used Cialis which has worked fine for him twice since we last spoke.\par \par Blood studies demonstrated an normal PSA, LH estradiol, prolactin, FSH,.  His testosterone was still low at 207 ng/dL but higher than it was in March at 173 ng/dL.  At the height his testosterone was 813 ng/dL but has averaged between 304 100 ng/dL\par \par We again discussed various options including injectable therapy and gels.  He would still like to think about it and again repeat his blood tests in 2 weeks to see if his testosterone continues to increase.  We will discuss the results in 2 weeks by telehealth.\par \par Telehealth Consultation: 30 minutes  10 minutes reviewing his history and discussing prior results.  20 minutes discussing various treatment options, requests for lab testing and writing his note. There was also additional time in preparing for the visit and assisting the patient with technology issues he was having with the telehealth platform.

## 2020-10-02 NOTE — HISTORY OF PRESENT ILLNESS
[Home] : at home, [unfilled] , at the time of the visit. [Medical Office: ([facility])___] : at the medical office located in  [Verbal consent obtained from patient] : the patient, [unfilled] [FreeTextEntry1] : The patient-doctor. relationship has been established in a face-to-face fashion on real-time video audio HIPAA compliant communication using telemedicine software.  The patient's identity has been confirmed.  The patient was previously emailed a copy of the telemedicine consent.  The patient has had a chance to review and has now given verbal consent and has requested care to be assessed and treated through telemedicine. The patient understands there may be limitations in this process and that they need not need further follow-up care in the office and/or hospital settings. We were unable to use the [employer] platform and an alternative platform was utilized.\par \par Verbal consent was given on Friday, October 2, 2020 at 3:00 PM by the patient.  It was requested by the physician.  A written consent was previously sent for the patient to sign and return.\par \par The patient presented for a telehealth consultation.  He was remains off all testosterone supplementation.  He states he is less angry than he had been before and feels that his testosterone is getting better on its own.  He has been discussing with his wife whether to go back on testosterone supplementation and has been talking to my  regarding the availability of Testopel.  He has not decided what to do at this point in time.  We needed to review his recent blood studies.\par \par HPI:\par Patient presents with a chief complaint of erectile dysfunction and low testosterone. We reviewed the questionnaire he completed in detail. His erections are not modified with the degree of sexual stimulation. He states that his erections presently are oftentimes 6 out of 10 in both tumescence and rigidity.. He has difficulty maintaining an erection. He describes a normal libido. His sexual dysfunction occurs with both sexual relations and masturbation. His erections are well managed with 5mg cialis. \par \par His past medical history is non-contributory. 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 His review of systems and past medical history demonstrates no significant urologic issues (see patient completed questionnaire). His family history demonstrates no significant urologic issues. Refer to his patient questionnaire for his AUA symptom score and sexual function symptom (normal is 60-75).\par \par He was previously diagnosed with low testosterone 2 years ago. Originally had decreased energy and decreased libido symptoms. Has been treated by another urologist using testopel. He would like to continue testopel. For his ED, he is using 5mg cialis with satisfactory results. He also has mild LUTS, primarily difficulty initiating stream some times.\par

## 2021-10-18 ENCOUNTER — RX RENEWAL (OUTPATIENT)
Age: 56
End: 2021-10-18

## 2021-11-29 ENCOUNTER — NON-APPOINTMENT (OUTPATIENT)
Age: 56
End: 2021-11-29

## 2021-11-30 ENCOUNTER — APPOINTMENT (OUTPATIENT)
Dept: INTERNAL MEDICINE | Facility: CLINIC | Age: 56
End: 2021-11-30

## 2022-01-25 ENCOUNTER — NON-APPOINTMENT (OUTPATIENT)
Age: 57
End: 2022-01-25

## 2022-01-26 ENCOUNTER — APPOINTMENT (OUTPATIENT)
Dept: INTERNAL MEDICINE | Facility: CLINIC | Age: 57
End: 2022-01-26